# Patient Record
Sex: MALE | Race: WHITE | Employment: STUDENT | URBAN - METROPOLITAN AREA
[De-identification: names, ages, dates, MRNs, and addresses within clinical notes are randomized per-mention and may not be internally consistent; named-entity substitution may affect disease eponyms.]

---
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
## 2020-12-03 DIAGNOSIS — L70.0 ACNE VULGARIS: Primary | ICD-10-CM

## 2020-12-03 LAB
ALT SERPL W P-5'-P-CCNC: 27 U/L (ref 0–70)
AST SERPL W P-5'-P-CCNC: 14 U/L (ref 0–45)
BASOPHILS # BLD AUTO: 0 10E9/L (ref 0–0.2)
BASOPHILS NFR BLD AUTO: 0.4 %
CHOLEST SERPL-MCNC: 149 MG/DL
DIFFERENTIAL METHOD BLD: NORMAL
EOSINOPHIL # BLD AUTO: 0.3 10E9/L (ref 0–0.7)
EOSINOPHIL NFR BLD AUTO: 6.2 %
ERYTHROCYTE [DISTWIDTH] IN BLOOD BY AUTOMATED COUNT: 12.2 % (ref 10–15)
HCT VFR BLD AUTO: 47.9 % (ref 40–53)
HDLC SERPL-MCNC: 54 MG/DL
HGB BLD-MCNC: 17.5 G/DL (ref 13.3–17.7)
LDLC SERPL CALC-MCNC: 85 MG/DL
LYMPHOCYTES # BLD AUTO: 1.8 10E9/L (ref 0.8–5.3)
LYMPHOCYTES NFR BLD AUTO: 34.7 %
MCH RBC QN AUTO: 30.7 PG (ref 26.5–33)
MCHC RBC AUTO-ENTMCNC: 36.5 G/DL (ref 31.5–36.5)
MCV RBC AUTO: 84 FL (ref 78–100)
MONOCYTES # BLD AUTO: 0.5 10E9/L (ref 0–1.3)
MONOCYTES NFR BLD AUTO: 9.3 %
NEUTROPHILS # BLD AUTO: 2.6 10E9/L (ref 1.6–8.3)
NEUTROPHILS NFR BLD AUTO: 49.4 %
NONHDLC SERPL-MCNC: 95 MG/DL
PLATELET # BLD AUTO: 266 10E9/L (ref 150–450)
RBC # BLD AUTO: 5.7 10E12/L (ref 4.4–5.9)
TRIGL SERPL-MCNC: 48 MG/DL
WBC # BLD AUTO: 5.2 10E9/L (ref 4–11)

## 2020-12-03 PROCEDURE — 84460 ALANINE AMINO (ALT) (SGPT): CPT | Performed by: DERMATOLOGY

## 2020-12-03 PROCEDURE — 84450 TRANSFERASE (AST) (SGOT): CPT | Performed by: DERMATOLOGY

## 2020-12-03 PROCEDURE — 80061 LIPID PANEL: CPT | Performed by: DERMATOLOGY

## 2020-12-03 PROCEDURE — 36415 COLL VENOUS BLD VENIPUNCTURE: CPT | Performed by: DERMATOLOGY

## 2020-12-03 PROCEDURE — 85025 COMPLETE CBC W/AUTO DIFF WBC: CPT | Performed by: DERMATOLOGY

## 2020-12-31 DIAGNOSIS — L70.0 ACNE VULGARIS: ICD-10-CM

## 2020-12-31 LAB
ALT SERPL W P-5'-P-CCNC: 85 U/L (ref 0–70)
AST SERPL W P-5'-P-CCNC: 49 U/L (ref 0–45)
BASOPHILS # BLD AUTO: 0 10E9/L (ref 0–0.2)
BASOPHILS NFR BLD AUTO: 0.2 %
CHOLEST SERPL-MCNC: 211 MG/DL
DIFFERENTIAL METHOD BLD: NORMAL
EOSINOPHIL # BLD AUTO: 0.3 10E9/L (ref 0–0.7)
EOSINOPHIL NFR BLD AUTO: 5.2 %
ERYTHROCYTE [DISTWIDTH] IN BLOOD BY AUTOMATED COUNT: 12.1 % (ref 10–15)
HCT VFR BLD AUTO: 48.8 % (ref 40–53)
HDLC SERPL-MCNC: 53 MG/DL
HGB BLD-MCNC: 17.4 G/DL (ref 13.3–17.7)
LDLC SERPL CALC-MCNC: 137 MG/DL
LYMPHOCYTES # BLD AUTO: 1.7 10E9/L (ref 0.8–5.3)
LYMPHOCYTES NFR BLD AUTO: 33.9 %
MCH RBC QN AUTO: 30.4 PG (ref 26.5–33)
MCHC RBC AUTO-ENTMCNC: 35.7 G/DL (ref 31.5–36.5)
MCV RBC AUTO: 85 FL (ref 78–100)
MONOCYTES # BLD AUTO: 0.4 10E9/L (ref 0–1.3)
MONOCYTES NFR BLD AUTO: 7.8 %
NEUTROPHILS # BLD AUTO: 2.7 10E9/L (ref 1.6–8.3)
NEUTROPHILS NFR BLD AUTO: 52.9 %
NONHDLC SERPL-MCNC: 158 MG/DL
PLATELET # BLD AUTO: 273 10E9/L (ref 150–450)
RBC # BLD AUTO: 5.73 10E12/L (ref 4.4–5.9)
TRIGL SERPL-MCNC: 106 MG/DL
WBC # BLD AUTO: 5 10E9/L (ref 4–11)

## 2020-12-31 PROCEDURE — 84450 TRANSFERASE (AST) (SGOT): CPT | Performed by: DERMATOLOGY

## 2020-12-31 PROCEDURE — 36415 COLL VENOUS BLD VENIPUNCTURE: CPT | Performed by: DERMATOLOGY

## 2020-12-31 PROCEDURE — 80061 LIPID PANEL: CPT | Performed by: DERMATOLOGY

## 2020-12-31 PROCEDURE — 84460 ALANINE AMINO (ALT) (SGPT): CPT | Performed by: DERMATOLOGY

## 2020-12-31 PROCEDURE — 85025 COMPLETE CBC W/AUTO DIFF WBC: CPT | Performed by: DERMATOLOGY

## 2021-01-25 DIAGNOSIS — L70.0 ACNE VULGARIS: ICD-10-CM

## 2021-01-25 LAB
ALT SERPL W P-5'-P-CCNC: 43 U/L (ref 0–70)
AST SERPL W P-5'-P-CCNC: 19 U/L (ref 0–45)
BASOPHILS # BLD AUTO: 0 10E9/L (ref 0–0.2)
BASOPHILS NFR BLD AUTO: 0.7 %
CHOLEST SERPL-MCNC: 207 MG/DL
DIFFERENTIAL METHOD BLD: NORMAL
EOSINOPHIL # BLD AUTO: 0.4 10E9/L (ref 0–0.7)
EOSINOPHIL NFR BLD AUTO: 6.8 %
ERYTHROCYTE [DISTWIDTH] IN BLOOD BY AUTOMATED COUNT: 12.1 % (ref 10–15)
HCT VFR BLD AUTO: 49.3 % (ref 40–53)
HDLC SERPL-MCNC: 55 MG/DL
HGB BLD-MCNC: 17.5 G/DL (ref 13.3–17.7)
LDLC SERPL CALC-MCNC: 135 MG/DL
LYMPHOCYTES # BLD AUTO: 2.8 10E9/L (ref 0.8–5.3)
LYMPHOCYTES NFR BLD AUTO: 49.2 %
MCH RBC QN AUTO: 30.4 PG (ref 26.5–33)
MCHC RBC AUTO-ENTMCNC: 35.5 G/DL (ref 31.5–36.5)
MCV RBC AUTO: 86 FL (ref 78–100)
MONOCYTES # BLD AUTO: 0.5 10E9/L (ref 0–1.3)
MONOCYTES NFR BLD AUTO: 8.2 %
NEUTROPHILS # BLD AUTO: 2 10E9/L (ref 1.6–8.3)
NEUTROPHILS NFR BLD AUTO: 35.1 %
NONHDLC SERPL-MCNC: 152 MG/DL
PLATELET # BLD AUTO: 273 10E9/L (ref 150–450)
RBC # BLD AUTO: 5.75 10E12/L (ref 4.4–5.9)
TRIGL SERPL-MCNC: 85 MG/DL
WBC # BLD AUTO: 5.6 10E9/L (ref 4–11)

## 2021-01-25 PROCEDURE — 84450 TRANSFERASE (AST) (SGOT): CPT | Performed by: DERMATOLOGY

## 2021-01-25 PROCEDURE — 84460 ALANINE AMINO (ALT) (SGPT): CPT | Performed by: DERMATOLOGY

## 2021-01-25 PROCEDURE — 85025 COMPLETE CBC W/AUTO DIFF WBC: CPT | Performed by: DERMATOLOGY

## 2021-01-25 PROCEDURE — 80061 LIPID PANEL: CPT | Performed by: DERMATOLOGY

## 2021-01-25 PROCEDURE — 36415 COLL VENOUS BLD VENIPUNCTURE: CPT | Performed by: DERMATOLOGY

## 2021-02-22 DIAGNOSIS — L70.0 ACNE VULGARIS: ICD-10-CM

## 2021-02-22 LAB
ALT SERPL W P-5'-P-CCNC: 40 U/L (ref 0–70)
AST SERPL W P-5'-P-CCNC: 22 U/L (ref 0–45)
BASOPHILS # BLD AUTO: 0 10E9/L (ref 0–0.2)
BASOPHILS NFR BLD AUTO: 0.2 %
CHOLEST SERPL-MCNC: 168 MG/DL
DIFFERENTIAL METHOD BLD: NORMAL
EOSINOPHIL # BLD AUTO: 0.2 10E9/L (ref 0–0.7)
EOSINOPHIL NFR BLD AUTO: 3.3 %
ERYTHROCYTE [DISTWIDTH] IN BLOOD BY AUTOMATED COUNT: 12.4 % (ref 10–15)
HCT VFR BLD AUTO: 47.2 % (ref 40–53)
HDLC SERPL-MCNC: 55 MG/DL
HGB BLD-MCNC: 16.5 G/DL (ref 13.3–17.7)
LDLC SERPL CALC-MCNC: 99 MG/DL
LYMPHOCYTES # BLD AUTO: 1.8 10E9/L (ref 0.8–5.3)
LYMPHOCYTES NFR BLD AUTO: 32.8 %
MCH RBC QN AUTO: 30.2 PG (ref 26.5–33)
MCHC RBC AUTO-ENTMCNC: 35 G/DL (ref 31.5–36.5)
MCV RBC AUTO: 86 FL (ref 78–100)
MONOCYTES # BLD AUTO: 0.4 10E9/L (ref 0–1.3)
MONOCYTES NFR BLD AUTO: 6.6 %
NEUTROPHILS # BLD AUTO: 3.1 10E9/L (ref 1.6–8.3)
NEUTROPHILS NFR BLD AUTO: 57.1 %
NONHDLC SERPL-MCNC: 113 MG/DL
PLATELET # BLD AUTO: 254 10E9/L (ref 150–450)
RBC # BLD AUTO: 5.46 10E12/L (ref 4.4–5.9)
TRIGL SERPL-MCNC: 70 MG/DL
WBC # BLD AUTO: 5.4 10E9/L (ref 4–11)

## 2021-02-22 PROCEDURE — 85025 COMPLETE CBC W/AUTO DIFF WBC: CPT | Performed by: DERMATOLOGY

## 2021-02-22 PROCEDURE — 80061 LIPID PANEL: CPT | Performed by: DERMATOLOGY

## 2021-02-22 PROCEDURE — 84460 ALANINE AMINO (ALT) (SGPT): CPT | Performed by: DERMATOLOGY

## 2021-02-22 PROCEDURE — 36415 COLL VENOUS BLD VENIPUNCTURE: CPT | Performed by: DERMATOLOGY

## 2021-02-22 PROCEDURE — 84450 TRANSFERASE (AST) (SGOT): CPT | Performed by: DERMATOLOGY

## 2021-03-22 DIAGNOSIS — L70.0 ACNE VULGARIS: ICD-10-CM

## 2021-03-22 LAB
ALT SERPL W P-5'-P-CCNC: 32 U/L (ref 0–70)
AST SERPL W P-5'-P-CCNC: 14 U/L (ref 0–45)
BASOPHILS # BLD AUTO: 0 10E9/L (ref 0–0.2)
BASOPHILS NFR BLD AUTO: 0.2 %
CHOLEST SERPL-MCNC: 177 MG/DL
DIFFERENTIAL METHOD BLD: NORMAL
EOSINOPHIL # BLD AUTO: 0.2 10E9/L (ref 0–0.7)
EOSINOPHIL NFR BLD AUTO: 5.1 %
ERYTHROCYTE [DISTWIDTH] IN BLOOD BY AUTOMATED COUNT: 12.5 % (ref 10–15)
HCT VFR BLD AUTO: 46.4 % (ref 40–53)
HDLC SERPL-MCNC: 54 MG/DL
HGB BLD-MCNC: 16.9 G/DL (ref 13.3–17.7)
LDLC SERPL CALC-MCNC: 112 MG/DL
LYMPHOCYTES # BLD AUTO: 1.5 10E9/L (ref 0.8–5.3)
LYMPHOCYTES NFR BLD AUTO: 33.9 %
MCH RBC QN AUTO: 31.3 PG (ref 26.5–33)
MCHC RBC AUTO-ENTMCNC: 36.4 G/DL (ref 31.5–36.5)
MCV RBC AUTO: 86 FL (ref 78–100)
MONOCYTES # BLD AUTO: 0.4 10E9/L (ref 0–1.3)
MONOCYTES NFR BLD AUTO: 8.6 %
NEUTROPHILS # BLD AUTO: 2.4 10E9/L (ref 1.6–8.3)
NEUTROPHILS NFR BLD AUTO: 52.2 %
NONHDLC SERPL-MCNC: 123 MG/DL
PLATELET # BLD AUTO: 295 10E9/L (ref 150–450)
RBC # BLD AUTO: 5.4 10E12/L (ref 4.4–5.9)
TRIGL SERPL-MCNC: 55 MG/DL
WBC # BLD AUTO: 4.5 10E9/L (ref 4–11)

## 2021-03-22 PROCEDURE — 80061 LIPID PANEL: CPT | Performed by: DERMATOLOGY

## 2021-03-22 PROCEDURE — 36415 COLL VENOUS BLD VENIPUNCTURE: CPT | Performed by: DERMATOLOGY

## 2021-03-22 PROCEDURE — 84460 ALANINE AMINO (ALT) (SGPT): CPT | Performed by: DERMATOLOGY

## 2021-03-22 PROCEDURE — 85025 COMPLETE CBC W/AUTO DIFF WBC: CPT | Performed by: DERMATOLOGY

## 2021-03-22 PROCEDURE — 84450 TRANSFERASE (AST) (SGOT): CPT | Performed by: DERMATOLOGY

## 2021-05-11 ENCOUNTER — OFFICE VISIT (OUTPATIENT)
Dept: FAMILY MEDICINE | Facility: CLINIC | Age: 21
End: 2021-05-11
Payer: COMMERCIAL

## 2021-05-11 ENCOUNTER — ANCILLARY PROCEDURE (OUTPATIENT)
Dept: GENERAL RADIOLOGY | Facility: CLINIC | Age: 21
End: 2021-05-11
Attending: PHYSICIAN ASSISTANT
Payer: COMMERCIAL

## 2021-05-11 VITALS
DIASTOLIC BLOOD PRESSURE: 84 MMHG | WEIGHT: 158 LBS | RESPIRATION RATE: 18 BRPM | SYSTOLIC BLOOD PRESSURE: 124 MMHG | OXYGEN SATURATION: 97 % | TEMPERATURE: 97.2 F | HEIGHT: 71 IN | HEART RATE: 80 BPM | BODY MASS INDEX: 22.12 KG/M2

## 2021-05-11 DIAGNOSIS — R05.9 COUGH: Primary | ICD-10-CM

## 2021-05-11 LAB
BASOPHILS # BLD AUTO: 0 10E9/L (ref 0–0.2)
BASOPHILS NFR BLD AUTO: 0.4 %
DIFFERENTIAL METHOD BLD: NORMAL
EOSINOPHIL # BLD AUTO: 0.2 10E9/L (ref 0–0.7)
EOSINOPHIL NFR BLD AUTO: 3.4 %
ERYTHROCYTE [DISTWIDTH] IN BLOOD BY AUTOMATED COUNT: 12.2 % (ref 10–15)
HCT VFR BLD AUTO: 47.2 % (ref 40–53)
HGB BLD-MCNC: 16.2 G/DL (ref 13.3–17.7)
LYMPHOCYTES # BLD AUTO: 1.6 10E9/L (ref 0.8–5.3)
LYMPHOCYTES NFR BLD AUTO: 29.9 %
MCH RBC QN AUTO: 30.4 PG (ref 26.5–33)
MCHC RBC AUTO-ENTMCNC: 34.3 G/DL (ref 31.5–36.5)
MCV RBC AUTO: 89 FL (ref 78–100)
MONOCYTES # BLD AUTO: 0.4 10E9/L (ref 0–1.3)
MONOCYTES NFR BLD AUTO: 6.9 %
NEUTROPHILS # BLD AUTO: 3.1 10E9/L (ref 1.6–8.3)
NEUTROPHILS NFR BLD AUTO: 59.4 %
PLATELET # BLD AUTO: 295 10E9/L (ref 150–450)
RBC # BLD AUTO: 5.33 10E12/L (ref 4.4–5.9)
WBC # BLD AUTO: 5.2 10E9/L (ref 4–11)

## 2021-05-11 PROCEDURE — 85025 COMPLETE CBC W/AUTO DIFF WBC: CPT | Performed by: PHYSICIAN ASSISTANT

## 2021-05-11 PROCEDURE — 99213 OFFICE O/P EST LOW 20 MIN: CPT | Mod: 25 | Performed by: PHYSICIAN ASSISTANT

## 2021-05-11 PROCEDURE — 71046 X-RAY EXAM CHEST 2 VIEWS: CPT | Performed by: RADIOLOGY

## 2021-05-11 PROCEDURE — 36415 COLL VENOUS BLD VENIPUNCTURE: CPT | Performed by: PHYSICIAN ASSISTANT

## 2021-05-11 RX ORDER — BENZONATATE 200 MG/1
200 CAPSULE ORAL 3 TIMES DAILY PRN
Qty: 30 CAPSULE | Refills: 0 | Status: SHIPPED | OUTPATIENT
Start: 2021-05-11 | End: 2021-05-21

## 2021-05-11 ASSESSMENT — MIFFLIN-ST. JEOR: SCORE: 1735.87

## 2021-05-11 NOTE — RESULT ENCOUNTER NOTE
Mr. Hemp,    All of your labs were normal/near normal for you.    Please contact the clinic if you have additional questions.  Thank you.    Sincerely,    Duarte Monroy PA-C

## 2021-05-11 NOTE — LETTER
May 11, 2021      Bhavesh Hope Hemp  [address]  [place] [place] [address]        MrJulia Hemp,     All of your labs were normal/near normal for you.     Please contact the clinic if you have additional questions.  Thank you.     Sincerely,     Duarte Monroy PA-C     Resulted Orders   CBC with platelets and differential   Result Value Ref Range    WBC 5.2 4.0 - 11.0 10e9/L    RBC Count 5.33 4.4 - 5.9 10e12/L    Hemoglobin 16.2 13.3 - 17.7 g/dL    Hematocrit 47.2 40.0 - 53.0 %    MCV 89 78 - 100 fl    MCH 30.4 26.5 - 33.0 pg    MCHC 34.3 31.5 - 36.5 g/dL    RDW 12.2 10.0 - 15.0 %    Platelet Count 295 150 - 450 10e9/L    % Neutrophils 59.4 %    % Lymphocytes 29.9 %    % Monocytes 6.9 %    % Eosinophils 3.4 %    % Basophils 0.4 %    Absolute Neutrophil 3.1 1.6 - 8.3 10e9/L    Absolute Lymphocytes 1.6 0.8 - 5.3 10e9/L    Absolute Monocytes 0.4 0.0 - 1.3 10e9/L    Absolute Eosinophils 0.2 0.0 - 0.7 10e9/L    Absolute Basophils 0.0 0.0 - 0.2 10e9/L    Diff Method Automated Method        If you have any questions or concerns, please call the clinic at the number listed above.       Sincerely,      Duarte Monroy PA-C/sp

## 2021-05-11 NOTE — PROGRESS NOTES
"    Assessment & Plan       ICD-10-CM    1. Cough  R05 XR Chest 2 Views     CBC with platelets and differential     benzonatate (TESSALON) 200 MG capsule     Warning signs discussed.  side effects discussed  Symptomatic treatment: such as fluids,  OTC acetaminophen and /or non-steroidal anti-inflammatory medication.  Follow up  1-2 wks as needed  differed another COVID testing today     Full Personal Protective Equipment was used during face to face contact with patient.     Return in about 1 week (around 5/18/2021) for recheck, As Needed.    [facility] 20 [facility] who presents for the following health issues  accompanied by his mother:    HPI     Acute Illness  Acute illness concerns: Cough   Onset/Duration: 1.5 weeks   Symptoms:  Fever: no  Chills/Sweats: no  Headache (location?): no  Sinus Pressure: YES  Conjunctivitis:  no  Ear Pain: no  Rhinorrhea: YES  Congestion: YES  Sore Throat: no  Cough: YES  Wheeze: no  Decreased Appetite: no  Nausea: no  Vomiting: no  Diarrhea: no  Dysuria/Freq.: no  Dysuria or Hematuria: no  Fatigue/Achiness: no  Sick/Strep Exposure: YES- friend with pneumonia   Therapies tried and outcome: none   Had negative covid test on 5/4/21    Review of Systems   Constitutional, HEENT, cardiovascular, pulmonary, gi and gu systems are negative, except as otherwise noted.      Objective    /84   Pulse 80   Temp 97.2  F (36.2  C) (Tympanic)   Resp 18   Ht 1.791 m (5' 10.5\")   Wt 71.7 kg (158 lb)   SpO2 97%   BMI 22.35 kg/m    Body mass index is 22.35 kg/m .  Physical Exam   GENERAL: healthy, alert and no distress  EYES: Eyes grossly normal to inspection, PERRL and conjunctivae and sclerae normal  HENT: ear canals and TM's normal, nose and mouth without ulcers or lesions  NECK: no adenopathy, no asymmetry, masses, or scars and thyroid normal to palpation  RESP: lungs clear to auscultation - no rales, rhonchi or " wheezes  CV: regular rate and rhythm, normal S1 S2, no S3 or S4, no murmur, click or rub, no peripheral edema and peripheral pulses strong  MS: no gross musculoskeletal defects noted, no edema  SKIN: no suspicious lesions or rashes  NEURO: Normal strength and tone, mentation intact and speech normal  PSYCH: mentation appears normal, affect normal/bright      Results for orders placed or performed in visit on 05/11/21   XR Chest 2 Views     Status: None    Narrative    CHEST TWO VIEWS   5/11/2021 12:09 PM     HISTORY: Cough.    COMPARISON: Chest x-ray 6/10/2015.      Impression    IMPRESSION: PA and lateral views of the chest. Lungs are clear. Heart  is normal in size. No effusions are evident. No pneumothorax.    HANSEL ALLISON MD   CBC with platelets and differential     Status: None   Result Value Ref Range    WBC 5.2 4.0 - 11.0 10e9/L    RBC Count 5.33 4.4 - 5.9 10e12/L    Hemoglobin 16.2 13.3 - 17.7 g/dL    Hematocrit 47.2 40.0 - 53.0 %    MCV 89 78 - 100 fl    MCH 30.4 26.5 - 33.0 pg    MCHC 34.3 31.5 - 36.5 g/dL    RDW 12.2 10.0 - 15.0 %    Platelet Count 295 150 - 450 10e9/L    % Neutrophils 59.4 %    % Lymphocytes 29.9 %    % Monocytes 6.9 %    % Eosinophils 3.4 %    % Basophils 0.4 %    Absolute Neutrophil 3.1 1.6 - 8.3 10e9/L    Absolute Lymphocytes 1.6 0.8 - 5.3 10e9/L    Absolute Monocytes 0.4 0.0 - 1.3 10e9/L    Absolute Eosinophils 0.2 0.0 - 0.7 10e9/L    Absolute Basophils 0.0 0.0 - 0.2 10e9/L    Diff Method Automated Method

## 2023-04-11 ENCOUNTER — OFFICE VISIT (OUTPATIENT)
Dept: FAMILY MEDICINE | Facility: CLINIC | Age: 23
End: 2023-04-11
Payer: COMMERCIAL

## 2023-04-11 VITALS
SYSTOLIC BLOOD PRESSURE: 111 MMHG | HEART RATE: 75 BPM | HEIGHT: 71 IN | BODY MASS INDEX: 25.34 KG/M2 | WEIGHT: 181 LBS | OXYGEN SATURATION: 98 % | DIASTOLIC BLOOD PRESSURE: 74 MMHG | RESPIRATION RATE: 16 BRPM

## 2023-04-11 DIAGNOSIS — Z23 NEED FOR VACCINATION: ICD-10-CM

## 2023-04-11 DIAGNOSIS — Z11.1 TUBERCULOSIS SCREENING: Primary | ICD-10-CM

## 2023-04-11 PROCEDURE — 36415 COLL VENOUS BLD VENIPUNCTURE: CPT | Performed by: PHYSICIAN ASSISTANT

## 2023-04-11 PROCEDURE — 99213 OFFICE O/P EST LOW 20 MIN: CPT | Mod: 25 | Performed by: PHYSICIAN ASSISTANT

## 2023-04-11 PROCEDURE — 86481 TB AG RESPONSE T-CELL SUSP: CPT | Performed by: PHYSICIAN ASSISTANT

## 2023-04-11 PROCEDURE — 90471 IMMUNIZATION ADMIN: CPT | Performed by: PHYSICIAN ASSISTANT

## 2023-04-11 PROCEDURE — 90715 TDAP VACCINE 7 YRS/> IM: CPT | Performed by: PHYSICIAN ASSISTANT

## 2023-04-11 RX ORDER — DOXYCYCLINE HYCLATE 50 MG/1
50 CAPSULE ORAL DAILY
COMMUNITY
Start: 2023-04-11

## 2023-04-11 ASSESSMENT — PAIN SCALES - GENERAL: PAINLEVEL: NO PAIN (0)

## 2023-04-11 NOTE — LETTER
April 13, 2023      Mo Jayy Hemp  [address] [place] [address]            Mo,     Your screening blood test for TB was negative.     Renata Graff PA-C     Resulted Orders   Quantiferon TB Gold Plus Grey Tube   Result Value Ref Range    Quantiferon Nil Tube 0.02 IU/mL   Quantiferon TB Gold Plus Green Tube   Result Value Ref Range    Quantiferon TB1 Tube 0.03 IU/mL   Quantiferon TB Gold Plus Yellow Tube   Result Value Ref Range    Quantiferon TB2 Tube 0.02    Quantiferon TB Gold Plus Purple Tube   Result Value Ref Range    Quantiferon Mitogen 10.00 IU/mL   Quantiferon TB Gold Plus   Result Value Ref Range    Quantiferon-TB Gold Plus Negative Negative      Comment:      No interferon gamma response to M.tuberculosis antigens was detected. Infection with M.tuberculosis is unlikely, however a single negative result does not exclude infection. In patients at high risk for infection, a second test should be considered in accordance with the 2017 ATS/IDSA/CDC Clinical Pract  ice Guidelines for Diagnosis of Tuberculosis in Adults and Children     TB1 Ag minus Nil Value 0.01 IU/mL    TB2 Ag minus Nil Value 0.00 IU/mL    Mitogen minus Nil Result 9.98 IU/mL    Nil Result 0.02 IU/mL       If you have any questions or concerns, please call the clinic at the number listed above.

## 2023-04-11 NOTE — PROGRESS NOTES
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     Discussed with pt that he is due for his tetanus booster since this was last done in 2010. He called his mother to verify and they agree to have him get this booster today.    Past Medical History:   Diagnosis Date     Acne     derm prescribed doxycycline     Anxiety      Fatigue 11/30/2016     Pervasive developmental disorder      Sleeping difficulty      No past surgical history on file.  Social History     Tobacco Use     Smoking status: Never     Smokeless tobacco: Never   Vaping Use     Vaping status: Not on file   Substance Use Topics     Alcohol use: No     Current Outpatient Medications   Medication Sig Dispense Refill     doxycycline hyclate (VIBRAMYCIN) 50 MG capsule Take 1 capsule (50 mg) by mouth daily       Allergies   Allergen Reactions     Nkda [No Known Drug Allergies]      FAMILY HISTORY NOTED AND REVIEWED      PHYSICAL EXAM:    /74 (BP Location: Right arm, Patient Position: Sitting, Cuff Size: Adult Regular)   Pulse 75   Resp 16   Ht 1.791 m (5' 10.5\")   Wt 82.1 kg (181 lb)   SpO2 98%   BMI 25.60 kg/m      Patient appears non toxic    Assessment and Plan:     (Z11.1) Tuberculosis screening  (primary encounter diagnosis)  Comment: 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  Plan: Quantiferon TB Gold Plus            (Z23) Need for vaccination  Comment:   Plan: TDAP VACCINE (Adacel, Boostrix)              Renata Graff PA-C        "

## 2023-04-11 NOTE — NURSING NOTE
Prior to immunization administration, verified patients identity using patient s name and date of birth. Please see Immunization Activity for additional information.     Screening Questionnaire for Adult Immunization    Are you sick today?   No   Do you have allergies to medications, food, a vaccine component or latex?   No   Have you ever had a serious reaction after receiving a vaccination?   No   Do you have a long-term health problem with heart, lung, kidney, or metabolic disease (e.g., diabetes), asthma, a blood disorder, no spleen, complement component deficiency, a cochlear implant, or a spinal fluid leak?  Are you on long-term aspirin therapy?   No   Do you have cancer, leukemia, HIV/AIDS, or any other immune system problem?   No   Do you have a parent, brother, or sister with an immune system problem?   No   In the past 3 months, have you taken medications that affect  your immune system, such as prednisone, other steroids, or anticancer drugs; drugs for the treatment of rheumatoid arthritis, Crohn s disease, or psoriasis; or have you had radiation treatments?   No   Have you had a seizure, or a brain or other nervous system problem?   No   During the past year, have you received a transfusion of blood or blood    products, or been given immune (gamma) globulin or antiviral drug?   No   For women: Are you pregnant or is there a chance you could become       pregnant during the next month?   No   Have you received any vaccinations in the past 4 weeks?   No     Immunization questionnaire answers were all negative.      Injection of TDAP given by Chelsy Hamm MA. Patient instructed to remain in clinic for 15 minutes afterwards, and to report any adverse reactions.     Screening performed by Chelsy Hamm MA on 4/11/2023 at 9:49 AM.

## 2023-04-12 LAB
GAMMA INTERFERON BACKGROUND BLD IA-ACNC: 0.02 IU/ML
M TB IFN-G BLD-IMP: NEGATIVE
M TB IFN-G CD4+ BCKGRND COR BLD-ACNC: 9.98 IU/ML
MITOGEN IGNF BCKGRD COR BLD-ACNC: 0 IU/ML
MITOGEN IGNF BCKGRD COR BLD-ACNC: 0.01 IU/ML
QUANTIFERON MITOGEN: 10 IU/ML
QUANTIFERON NIL TUBE: 0.02 IU/ML
QUANTIFERON TB1 TUBE: 0.03 IU/ML
QUANTIFERON TB2 TUBE: 0.02

## 2024-12-24 ENCOUNTER — OFFICE VISIT (OUTPATIENT)
Dept: FAMILY MEDICINE | Facility: CLINIC | Age: 24
End: 2024-12-24
Payer: COMMERCIAL

## 2024-12-24 VITALS
RESPIRATION RATE: 12 BRPM | BODY MASS INDEX: 21.39 KG/M2 | OXYGEN SATURATION: 100 % | HEIGHT: 71 IN | WEIGHT: 152.8 LBS | DIASTOLIC BLOOD PRESSURE: 76 MMHG | TEMPERATURE: 96 F | SYSTOLIC BLOOD PRESSURE: 115 MMHG | HEART RATE: 72 BPM

## 2024-12-24 DIAGNOSIS — Z00.00 ROUTINE HISTORY AND PHYSICAL EXAMINATION OF ADULT: Primary | ICD-10-CM

## 2024-12-24 DIAGNOSIS — F41.1 GAD (GENERALIZED ANXIETY DISORDER): ICD-10-CM

## 2024-12-24 DIAGNOSIS — F84.0 AUTISM SPECTRUM DISORDER: ICD-10-CM

## 2024-12-24 LAB
ALBUMIN SERPL BCG-MCNC: 5.1 G/DL (ref 3.5–5.2)
ALP SERPL-CCNC: 64 U/L (ref 40–150)
ALT SERPL W P-5'-P-CCNC: 16 U/L (ref 0–70)
ANION GAP SERPL CALCULATED.3IONS-SCNC: 14 MMOL/L (ref 7–15)
AST SERPL W P-5'-P-CCNC: 20 U/L (ref 0–45)
BILIRUB SERPL-MCNC: 0.6 MG/DL
BUN SERPL-MCNC: 15.8 MG/DL (ref 6–20)
CALCIUM SERPL-MCNC: 10.1 MG/DL (ref 8.8–10.4)
CHLORIDE SERPL-SCNC: 102 MMOL/L (ref 98–107)
CREAT SERPL-MCNC: 0.94 MG/DL (ref 0.67–1.17)
EGFRCR SERPLBLD CKD-EPI 2021: >90 ML/MIN/1.73M2
ERYTHROCYTE [DISTWIDTH] IN BLOOD BY AUTOMATED COUNT: 11.5 % (ref 10–15)
GLUCOSE SERPL-MCNC: 87 MG/DL (ref 70–99)
HCO3 SERPL-SCNC: 26 MMOL/L (ref 22–29)
HCT VFR BLD AUTO: 48.4 % (ref 40–53)
HGB BLD-MCNC: 17.2 G/DL (ref 13.3–17.7)
MCH RBC QN AUTO: 31 PG (ref 26.5–33)
MCHC RBC AUTO-ENTMCNC: 35.5 G/DL (ref 31.5–36.5)
MCV RBC AUTO: 87 FL (ref 78–100)
PLATELET # BLD AUTO: 271 10E3/UL (ref 150–450)
POTASSIUM SERPL-SCNC: 4.1 MMOL/L (ref 3.4–5.3)
PROT SERPL-MCNC: 7.6 G/DL (ref 6.4–8.3)
RBC # BLD AUTO: 5.55 10E6/UL (ref 4.4–5.9)
SODIUM SERPL-SCNC: 142 MMOL/L (ref 135–145)
WBC # BLD AUTO: 4.9 10E3/UL (ref 4–11)

## 2024-12-24 PROCEDURE — 80053 COMPREHEN METABOLIC PANEL: CPT | Performed by: FAMILY MEDICINE

## 2024-12-24 PROCEDURE — 99395 PREV VISIT EST AGE 18-39: CPT | Performed by: FAMILY MEDICINE

## 2024-12-24 PROCEDURE — 36415 COLL VENOUS BLD VENIPUNCTURE: CPT | Performed by: FAMILY MEDICINE

## 2024-12-24 PROCEDURE — 85027 COMPLETE CBC AUTOMATED: CPT | Performed by: FAMILY MEDICINE

## 2024-12-24 RX ORDER — FINASTERIDE 1 MG/1
1 TABLET, FILM COATED ORAL
COMMUNITY
Start: 2024-11-21

## 2024-12-24 SDOH — HEALTH STABILITY: PHYSICAL HEALTH: ON AVERAGE, HOW MANY MINUTES DO YOU ENGAGE IN EXERCISE AT THIS LEVEL?: 60 MIN

## 2024-12-24 SDOH — HEALTH STABILITY: PHYSICAL HEALTH: ON AVERAGE, HOW MANY DAYS PER WEEK DO YOU ENGAGE IN MODERATE TO STRENUOUS EXERCISE (LIKE A BRISK WALK)?: 4 DAYS

## 2024-12-24 ASSESSMENT — SOCIAL DETERMINANTS OF HEALTH (SDOH): HOW OFTEN DO YOU GET TOGETHER WITH FRIENDS OR RELATIVES?: TWICE A WEEK

## 2024-12-24 NOTE — LETTER
December 26, 2024      Jeet Hope Hemp  [address]  [place] [address]        Dear ,    We are writing to inform you of your test results.    Your test results fall within the expected range(s) or remain unchanged from previous results.  Please continue with current treatment plan.    Resulted Orders   Comprehensive metabolic panel   Result Value Ref Range    Sodium 142 135 - 145 mmol/L    Potassium 4.1 3.4 - 5.3 mmol/L    Carbon Dioxide (CO2) 26 22 - 29 mmol/L    Anion Gap 14 7 - 15 mmol/L    Urea Nitrogen 15.8 6.0 - 20.0 mg/dL    Creatinine 0.94 0.67 - 1.17 mg/dL    GFR Estimate >90 >60 mL/min/1.73m2      Comment:      eGFR calculated using 2021 CKD-EPI equation.    Calcium 10.1 8.8 - 10.4 mg/dL      Comment:      Reference intervals for this test were updated on 7/16/2024 to reflect our healthy population more accurately. There may be differences in the flagging of prior results with similar values performed with this method. Those prior results can be interpreted in the context of the updated reference intervals.    Chloride 102 98 - 107 mmol/L    Glucose 87 70 - 99 mg/dL    Alkaline Phosphatase 64 40 - 150 U/L    AST 20 0 - 45 U/L    ALT 16 0 - 70 U/L    Protein Total 7.6 6.4 - 8.3 g/dL    Albumin 5.1 3.5 - 5.2 g/dL    Bilirubin Total 0.6 <=1.2 mg/dL   CBC with platelets   Result Value Ref Range    WBC Count 4.9 4.0 - 11.0 10e3/uL    RBC Count 5.55 4.40 - 5.90 10e6/uL    Hemoglobin 17.2 13.3 - 17.7 g/dL    Hematocrit 48.4 40.0 - 53.0 %    MCV 87 78 - 100 fL    MCH 31.0 26.5 - 33.0 pg    MCHC 35.5 31.5 - 36.5 g/dL    RDW 11.5 10.0 - 15.0 %    Platelet Count 271 150 - 450 10e3/uL       If you have any questions or concerns, please call the clinic at the number listed above.       Sincerely,      Roosevelt Schmitt MD    Electronically signed

## 2024-12-24 NOTE — PROGRESS NOTES
Preventive Care Visit  [facility]  Roosevelt Schmitt MD, Family Medicine  Dec 24, 2024      ASSESSMENT / PLAN:  (Z00.00) Routine history and physical examination of adult  (primary encounter diagnosis)  Comment: genearlly healthy and normal exam  Plan: Comprehensive metabolic panel, CBC with         platelets        Reviewed self mole/testicle check handout.  Continue mvi/exercise. Discussed safe sex/drugs and ALCOHOL and driving. Home and work ok. Call/email with questions/concerns      (F41.1) NEO (generalized anxiety disorder)  Comment: improving since summer  Plan: continue exercise and limit caffeine/ ALCOHOL. Over the counter sleep aides and no cell phone before bedtime. Consider selective serotonin reuptake inhibitor or prn sleep aides or back to psychology. If SUICIAL IDEATION OR HOMOCIDAL IDEATION OR KATIE TO ER Call/email with questions/concerns      (Z68.21) BMI 21.0-21.9, adult  Comment: needs help  Plan: discussed adding more protein/fat in diet.  Await labs. Consider selective serotonin reuptake inhibitor or follow-up GI if worsening or new symptoms likely nausea, vomiting or diarrhea or constipation or abdominal pain. Expected course and warning signs reviewed. Call/email with questions/concerns      (F84.0) Autism spectrum disorder  Comment: highly functioning and good support  Plan: continue monitor     Subjective   Mo is a 24 year old, presenting for the following:  Physical (Per pt has concerns about not getting enough, nutrients. )    Nutritional concerns.   Works in assisted living -ok. Past 2 years.   Likes salads. Limited fruits. Likes veggies.   Chicken. Limited burgers.   MVI. Exercise - mostly cardio. Liiftime classes.   Sleep issues with staying asleep.   Some depression/anxiety issues. Seen psychology in past.   Not sure medications. No SUICIAL IDEATION OR HOMOCIDAL IDEATION OR KATIE.  No dating. Emotionally doing ok.   Friends - lot. Family doing ok.    Over the  counter sleep aides in past -unisom.   Bedtime - phone better bedtime.  Patient new to myself. History autism and lucy.   Propecia for hair loss  -helpful.   No mole changes or rashes. No testicle masses/nernia/pain. No std concerrns. No history blood transfusion. No chest pain or shortness of breath. Good exercise tolerance. No nausea, vomiting or diarrhea or black/bloody stools. No urine changes or hematuria.   Eye exam - no glasses.  Dentist reguarly.   No pop. Rare weekends. One meal day. Protein shake.   No ALCOHOL in past 2 days.  mother, father, brother and 4 sisters       12/24/2024    11:19 AM   Additional Questions   Roomed by Ann Marie   Accompanied by self         12/24/2024    11:19 AM   Patient Reported Additional Medications   Patient reports taking the following new medications n/a          HPI        Health Care Directive  Patient does not have a Health Care Directive: Discussed advance care planning with patient; however, patient declined at this time.      12/24/2024   General Health   How would you rate your overall physical health? Good   Feel stress (tense, anxious, or unable to sleep) Rather much   (!) STRESS CONCERN      12/24/2024   Nutrition   Three or more servings of calcium each day? (!) NO   Diet: Breakfast skipped   How many servings of fruit and vegetables per day? (!) 0-1   How many sweetened beverages each day? 0-1         12/24/2024   Exercise   Days per week of moderate/strenous exercise 4 days   Average minutes spent exercising at this level 60 min         12/24/2024   Social Factors   Frequency of gathering with friends or relatives Twice a week   Worry food won't last until get money to buy more No   Food not last or not have enough money for food? No   Do you have housing? (Housing is defined as stable permanent housing and does not include staying ouside in a car, in a tent, in an abandoned building, in an overnight shelter, or couch-surfing.) Yes   Are you worried about losing  "your housing? No   Lack of transportation? No   Unable to get utilities (heat,electricity)? No         12/24/2024   Dental   Dentist two times every year? Yes            Today's PHQ-2 Score:       12/24/2024    11:17 AM   PHQ-2 ( 1999 [employer])   Q1: Little interest or pleasure in doing things 0   Q2: Feeling down, depressed or hopeless 0   PHQ-2 Score 0    Q1: Little interest or pleasure in doing things Not at all   Q2: Feeling down, depressed or hopeless Not at all   PHQ-2 Score 0       Patient-reported           12/24/2024   Substance Use   Alcohol more than 3/day or more than 7/wk No   Do you use any other substances recreationally? (!) PRESCRIPTION DRUGS     Social History     Tobacco Use    Smoking status: Never    Smokeless tobacco: Never   Substance Use Topics    Alcohol use: No    Drug use: No             12/24/2024   One time HIV Screening   Previous HIV test? No         12/24/2024   STI Screening   New sexual partner(s) since last STI/HIV test? No         12/24/2024   Contraception/Family Planning   Questions about contraception or family planning No        Reviewed and updated as needed this visit by Provider                           Objective    Exam  /76   Pulse 72   Temp (!) 96  F (35.6  C) (Tympanic)   Resp 12   Ht 1.803 m (5' 11\")   Wt 69.3 kg (152 lb 12.8 oz)   SpO2 100%   BMI 21.31 kg/m     Estimated body mass index is 21.31 kg/m  as calculated from the following:    Height as of this encounter: 1.803 m (5' 11\").    Weight as of this encounter: 69.3 kg (152 lb 12.8 oz).    Physical Exam  GENERAL: alert and no distress  EYES: Eyes grossly normal to inspection, PERRL and conjunctivae and sclerae normal  HENT: ear canals and TM's normal, nose and mouth without ulcers or lesions  NECK: no adenopathy, no asymmetry, masses, or scars  RESP: lungs clear to auscultation - no rales, rhonchi or wheezes  BREAST: normal without masses, tenderness or nipple discharge and no palpable axillary masses " or adenopathy  CV: regular rate and rhythm, normal S1 S2, no S3 or S4, no murmur, click or rub, no peripheral edema   ABDOMEN: soft, nontender, no hepatosplenomegaly, no masses and bowel sounds normal   (male): patient deferred exam  MS: no gross musculoskeletal defects noted, no edema  SKIN: no suspicious lesions or rashes  NEURO: Normal strength and tone, mentation intact and speech normal  PSYCH: mentation appears normal, affect normal/bright  PSYCH: mildy anxious  LYMPH: no cervical, supraclavicular, axillary, or inguinal adenopathy        Signed Electronically by: Roosevelt Schmitt MD